# Patient Record
(demographics unavailable — no encounter records)

---

## 2025-04-09 NOTE — PHYSICAL EXAM
[Sclera] : the sclera and conjunctiva were normal [PERRL With Normal Accommodation] : pupils were equal in size, round, and reactive to light [Extraocular Movements] : extraocular movements were intact [Neck Appearance] : the appearance of the neck was normal [Neck Cervical Mass (___cm)] : no neck mass was observed [Jugular Venous Distention Increased] : there was no jugular-venous distention [Thyroid Diffuse Enlargement] : the thyroid was not enlarged [Thyroid Nodule] : there were no palpable thyroid nodules [Auscultation Breath Sounds / Voice Sounds] : lungs were clear to auscultation bilaterally [Heart Rate And Rhythm] : heart rate was normal and rhythm regular [Heart Sounds] : normal S1 and S2 [Heart Sounds Gallop] : no gallops [Murmurs] : no murmurs [Heart Sounds Pericardial Friction Rub] : no pericardial rub [Examination Of The Chest] : the chest was normal in appearance [Chest Visual Inspection Thoracic Asymmetry] : no chest asymmetry [Diminished Respiratory Excursion] : normal chest expansion [Bowel Sounds] : normal bowel sounds [Abdomen Soft] : soft [Abdomen Tenderness] : non-tender [Abdomen Mass (___ Cm)] : no abdominal mass palpated [Abnormal Walk] : normal gait [Nail Clubbing] : no clubbing  or cyanosis of the fingernails [Musculoskeletal - Swelling] : no joint swelling seen [Motor Tone] : muscle strength and tone were normal [Skin Color & Pigmentation] : normal skin color and pigmentation [Skin Turgor] : normal skin turgor [] : no rash [Oriented To Time, Place, And Person] : oriented to person, place, and time [Impaired Insight] : insight and judgment were intact [Affect] : the affect was normal

## 2025-04-09 NOTE — PHYSICAL EXAM
[Sclera] : the sclera and conjunctiva were normal [PERRL With Normal Accommodation] : pupils were equal in size, round, and reactive to light [Extraocular Movements] : extraocular movements were intact [Neck Appearance] : the appearance of the neck was normal [Neck Cervical Mass (___cm)] : no neck mass was observed [Jugular Venous Distention Increased] : there was no jugular-venous distention [Thyroid Diffuse Enlargement] : the thyroid was not enlarged [Thyroid Nodule] : there were no palpable thyroid nodules [Auscultation Breath Sounds / Voice Sounds] : lungs were clear to auscultation bilaterally [Heart Rate And Rhythm] : heart rate was normal and rhythm regular [Heart Sounds] : normal S1 and S2 [Heart Sounds Gallop] : no gallops [Murmurs] : no murmurs [Heart Sounds Pericardial Friction Rub] : no pericardial rub [Examination Of The Chest] : the chest was normal in appearance [Chest Visual Inspection Thoracic Asymmetry] : no chest asymmetry [Diminished Respiratory Excursion] : normal chest expansion [Bowel Sounds] : normal bowel sounds [Abdomen Soft] : soft [Abdomen Tenderness] : non-tender [Abdomen Mass (___ Cm)] : no abdominal mass palpated [Nail Clubbing] : no clubbing  or cyanosis of the fingernails [Abnormal Walk] : normal gait [Musculoskeletal - Swelling] : no joint swelling seen [Motor Tone] : muscle strength and tone were normal [Skin Color & Pigmentation] : normal skin color and pigmentation [Skin Turgor] : normal skin turgor [] : no rash [Oriented To Time, Place, And Person] : oriented to person, place, and time [Impaired Insight] : insight and judgment were intact [Affect] : the affect was normal

## 2025-04-10 NOTE — ASSESSMENT
[FreeTextEntry1] : CT Chest (@[facility]) images reviewed and discussed revealing: -revisualization of cavitations in the periphery of the RUL, -The largest in the lateral ANNA associated with internal nodularity measuring up to 1.4 x 0.9 cm, slightly changed in position.  -The peripheral soft tissue component, including a minimally spiculated posterior RUL 1.6 x 1.2cm nodule, unchanged -Alternatives discussed such as surgical resection, including all associated risks and benefits. -Patient amenable to surgery. -Patient discussed his concern for his IVC Filter removal and was advised to have removed after procedure.  -Plan  #RUL Cavitation - Will plan for Bronch, Robotic assisted Right Thoracoscopy upper lobe wedge resection, possible segment on 4/21 -Preop: PAST, CT Chest, Cardiac Clearance (Dr. Kelley) recs for Xarelto hold.  -PFTs (from Dr. Harden forwarded) reviewed I, Vin Myers saw, examined and reviewed the diagnostic images on patient:  DAHIANA FLOWERS on 04/08/2025 and agreed with my Nurse Practitioner's clinical note, physical exam findings and treatment plan. 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  I had performed robotic bronchoscopy and fine-needle aspiration of the right upper lobe cavitary lesion in August 2024 cultures revealed Aspergillus.  Patient has been follow-up by pulmonary and ID and referred back to thoracic surgery to consider surgical treatment for aspergilloma.  I reviewed the CT scan images with the patient and the PFTs which are adequate for resection, with 2 cavitary lesions amenable for resection.  I described in detail robotic assisted right thoracoscopy upper lobe wedge resection possible segmentectomy/lobectomy.  Surgical risk and possible complications as well as expected recovery were disclosed.  Patient understood and agreed to proceed.

## 2025-04-10 NOTE — REASON FOR VISIT
[Follow-Up: _____] : a [unfilled] follow-up visit [FreeTextEntry1] : CT Chest review (@[facility]) RUL/ ANNA Cavitations

## 2025-04-10 NOTE — HISTORY OF PRESENT ILLNESS
[FreeTextEntry1] : Mr. Aniket Suero is a 68 y/o M, former smoker, S/P Bronch with FNA to Right Lung on 8/15/2024, Path proven negative for malignancy + Fungal organisms consistent with Aspergillus present. Patient recently had CT Chest (@[facility]) which reveals RUL & ANNA cavitations and a minimally spiculated posterior RUL 1.6 x 1.2 cm nodule. PMHX: HTN, DLD, DM, CVA on Xarelto, ILR, Seizure Disorder (last seizure 6/2024), FABIANA not on CPAP, DVT / PE,  s/p IVC Filter 6/2024 Patient is here today for review of CT Chest and possible surgical discussion.   ECOG 0 Fully Independent, ambulates with cane Lives alone, no aide Former Smoker- 1 PD X 30+ years Quit 5/2022 Denies major psychiatric history Patient is a Poor Historian  Exposure to Kerosene  Their Healthcare team is as follows: PMD: Justin Cardiologist: Dr Santos/Dr. Ana Lilia Kelley Pulmonologist: Dereck Whitaker: Dr. Carrion

## 2025-04-10 NOTE — REASON FOR VISIT
[Follow-Up: _____] : a [unfilled] follow-up visit [FreeTextEntry1] : CT Chest review (@[facility]) RUL/ ANNA Cavitations

## 2025-04-10 NOTE — REASON FOR VISIT
[Follow-Up: _____] : a [unfilled] follow-up visit [FreeTextEntry1] : CT Chest review (@[facility]) RUL/ ANNA Cavitations

## 2025-04-10 NOTE — HISTORY OF PRESENT ILLNESS
[FreeTextEntry1] : Mr. Aniket Suero is a 66 y/o M, former smoker, S/P Bronch with FNA to Right Lung on 8/15/2024, Path proven negative for malignancy + Fungal organisms consistent with Aspergillus present. Patient recently had CT Chest (@[facility]) which reveals RUL & ANNA cavitations and a minimally spiculated posterior RUL 1.6 x 1.2 cm nodule. PMHX: HTN, DLD, DM, CVA on Xarelto, ILR, Seizure Disorder (last seizure 6/2024), FABIANA not on CPAP, DVT / PE,  s/p IVC Filter 6/2024 Patient is here today for review of CT Chest and possible surgical discussion.   ECOG 0 Fully Independent, ambulates with cane Lives alone, no aide Former Smoker- 1 PD X 30+ years Quit 5/2022 Denies major psychiatric history Patient is a Poor Historian  Exposure to Kerosene  Their Healthcare team is as follows: PMD: Justin Cardiologist: Dr Santos/Dr. Ana Lilia Kelley Pulmonologist: Dereck Whitaker: Dr. Carrion

## 2025-04-10 NOTE — ASSESSMENT
[FreeTextEntry1] : CT Chest (@[facility]) images reviewed and discussed revealing: -revisualization of cavitations in the periphery of the RUL, -The largest in the lateral ANNA associated with internal nodularity measuring up to 1.4 x 0.9 cm, slightly changed in position.  -The peripheral soft tissue component, including a minimally spiculated posterior RUL 1.6 x 1.2cm nodule, unchanged -Alternatives discussed such as surgical resection, including all associated risks and benefits. -Patient amenable to surgery. -Patient discussed his concern for his IVC Filter removal and was advised to have removed after procedure.  -Plan  #RUL Cavitation - Will plan for Bronch, Robotic assisted Right Thoracoscopy upper lobe wedge resection, possible segment on 4/21 -Preop: PAST, CT Chest, Cardiac Clearance (Dr. Kelley) recs for Xarelto hold.  -PFTs (from Dr. Harden forwarded) reviewed I, Vin Myers saw, examined and reviewed the diagnostic images on patient:  DAHIANA FLOWERS on 04/08/2025 and agreed with my Nurse Practitioner's clinical note, physical exam findings and treatment plan. 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  I had performed robotic bronchoscopy and fine-needle aspiration of the right upper lobe cavitary lesion in August 2024 cultures revealed Aspergillus.  Patient has been follow-up by pulmonary and ID and referred back to thoracic surgery to consider surgical treatment for aspergilloma.  I reviewed the CT scan images with the patient and the PFTs which are adequate for resection, with 2 cavitary lesions amenable for resection.  I described in detail robotic assisted right thoracoscopy upper lobe wedge resection possible segmentectomy/lobectomy.  Surgical risk and possible complications as well as expected recovery were disclosed.  Patient understood and agreed to proceed.

## 2025-04-10 NOTE — ASSESSMENT
[FreeTextEntry1] : CT Chest (@[facility]) images reviewed and discussed revealing: -revisualization of cavitations in the periphery of the RUL, -The largest in the lateral ANNA associated with internal nodularity measuring up to 1.4 x 0.9 cm, slightly changed in position.  -The peripheral soft tissue component, including a minimally spiculated posterior RUL 1.6 x 1.2cm nodule, unchanged -Alternatives discussed such as surgical resection, including all associated risks and benefits. -Patient amenable to surgery. -Patient discussed his concern for his IVC Filter removal and was advised to have removed after procedure.  -Plan  #RUL Cavitation - Will plan for Bronch, Robotic assisted Right Thoracoscopy upper lobe wedge resection, possible segment on 4/21 -Preop: PAST, CT Chest, Cardiac Clearance (Dr. Kelley) recs for Xarelto hold.  -PFTs (from Dr. Harden forwarded) reviewed I, Vin Myers saw, examined and reviewed the diagnostic images on patient:  DAHIANA FLOWERS on 04/08/2025 and agreed with my Nurse Practitioner's clinical note, physical exam findings and treatment plan. 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  I had performed robotic bronchoscopy and fine-needle aspiration of the right upper lobe cavitary lesion in August 2024 cultures revealed Aspergillus.  Patient has been follow-up by pulmonary and ID and referred back to thoracic surgery to consider surgical treatment for aspergilloma.  I reviewed the CT scan images with the patient and the PFTs which are adequate for resection, with 2 cavitary lesions amenable for resection.  I described in detail robotic assisted right thoracoscopy upper lobe wedge resection possible segmentectomy/lobectomy.  Surgical risk and possible complications as well as expected recovery were disclosed.  Patient understood and agreed to proceed.

## 2025-04-10 NOTE — REASON FOR VISIT
[Follow-Up: _____] : a [unfilled] follow-up visit [FreeTextEntry1] : CT Chest review (@[facility]) RUL/ ANNA Cavitations

## 2025-04-10 NOTE — ASSESSMENT
[FreeTextEntry1] : CT Chest (@[facility]) images reviewed and discussed revealing: -revisualization of cavitations in the periphery of the RUL, -The largest in the lateral ANNA associated with internal nodularity measuring up to 1.4 x 0.9 cm, slightly changed in position.  -The peripheral soft tissue component, including a minimally spiculated posterior RUL 1.6 x 1.2cm nodule, unchanged -Alternatives discussed such as surgical resection, including all associated risks and benefits. -Patient amenable to surgery. -Patient discussed his concern for his IVC Filter removal and was advised to have removed after procedure.  -Plan  #RUL Cavitation - Will plan for Bronch, Robotic assisted Right Thoracoscopy upper lobe wedge resection, possible segment on 4/21 -Preop: PAST, CT Chest, Cardiac Clearance (Dr. Kelley) recs for Xarelto hold.  -PFTs (from Dr. aHrden forwarded) reviewed I, Vin Myers saw, examined and reviewed the diagnostic images on patient:  DAHIANA FLOWERS on 04/08/2025 and agreed with my Nurse Practitioner's clinical note, physical exam findings and treatment plan. 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  I had performed robotic bronchoscopy and fine-needle aspiration of the right upper lobe cavitary lesion in August 2024 cultures revealed Aspergillus.  Patient has been follow-up by pulmonary and ID and referred back to thoracic surgery to consider surgical treatment for aspergilloma.  I reviewed the CT scan images with the patient and the PFTs which are adequate for resection, with 2 cavitary lesions amenable for resection.  I described in detail robotic assisted right thoracoscopy upper lobe wedge resection possible segmentectomy/lobectomy.  Surgical risk and possible complications as well as expected recovery were disclosed.  Patient understood and agreed to proceed.

## 2025-05-07 NOTE — REASON FOR VISIT
[de-identified] : robotic assisted right VATs, RUL Wedge resection X 2 [de-identified] : 4/21/25 [de-identified] : Post op Visit and Path review (No Malignancy)

## 2025-05-07 NOTE — PHYSICAL EXAM
[] : no respiratory distress [Auscultation Breath Sounds / Voice Sounds] : lungs were clear to auscultation bilaterally [Heart Rate And Rhythm] : heart rate was normal and rhythm regular [Heart Sounds] : normal S1 and S2 [Heart Sounds Gallop] : no gallops [Murmurs] : no murmurs [Heart Sounds Pericardial Friction Rub] : no pericardial rub [Clean] : clean [Dry] : dry [Healing Well] : healing well [No Edema] : no edema [FreeTextEntry2] : Right Mid axillary

## 2025-05-07 NOTE — ASSESSMENT
[FreeTextEntry1] : Mr. DAHIANA FLOWERS is a 67 year M, former smoker, PMH: HTN, DLD, DM, CVA on Xarelto (held during hospital stay) , ILR, Seizure Disorder (last seizure 2 Months ago), FABIANA, IVC Filter 6/2024. Patient known to service for 3.6 X2.5 and 3.5 X 2.5 CM and FDG Avid RUL cavitary Masses s/p Bronch with FNA (8/15/2024) Path + Fungal organisms consistent with Aspergillus. On 4/21/25, patient presented for robotic assisted right VATs for right upper lobe wedge resections. Post-operatively patient had a right chest tube that was placed to suction with a small airleak, output was 210 cc SS. Patient was on 4L NC and was pulling 500- 1000 on IS. Post op chest xray was stable with no signs of pneumothorax. Patient was receiving ancef for abx. On 04/22 chest tube was to waterseal with no air leak. Patients glucose has spiked to the 600s while in CTU and was given insulin and went down to 180s. Patient was also downgraded. On 4/23 chest tube was removed and post pull x ray with no pneumothorax. Pain was controlled. Tolerated diet. Denies SOB, chest pain, nausea , or vomiting. Patient is stable and afebrile. Labs were reviewed and within normal limits. Vitals are within normal limits. Patient was discharged on 04/23 and was informed to follow up with Dr. Spencer Myers in 2 weeks.  Chest xray obtained (@ [facility]) prior to office visit. Patient states he fell at home a few days ago, fall was mechanical, states he had a superficial abbrasion to his head, denies any LOC, dizziness, visual disturbances or any neurological deficits. Educated patient to report to ED if falls and has trauma to his head, as he is on blood thinners.   -Plan #s/p RUL Wedge resections -CXR Reviewed: Stable -Pain: is having moderate amount of pain ineffectively alleviated with OTC analgesia -RX'd Percocet PRN  -Incisions: healing well -Pathology reveals:  -Right upper lobe, nodule #1 & # 2: wedge resections: - Pulmonary aspergillosis, subacute, with chronic inflammation, necrosis, cavitary formation, and fibrosis. 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 - Emphysema of the lung -No need for ID follow up at this time (as per Dr. Harris) -F/U with Pulmonary: Dr. Harden for continued management -Patient inquired about Physical Therapy and is not restricted from resuming, if maintaining heavy lifting restriction -Referral sent to resume PT -CT Chest in 4 months -F/U CTS for review I, Vin Myers saw, examined and reviewed the diagnostic images on patient:  DAHIANA FLOWERS on 05/06/2025 and agreed with my Nurse Practitioner's clinical note, physical exam findings and treatment plan. Patient presented for postoperative follow-up.  He underwent robotic assisted right thoracoscopy right upper lobe wedge resection x 2, 2 cavitary lesions were resected for a known diagnosis of aspergilloma.  Procedure date 4/21/2025.  Procedure without complications.  Patient is recovering well.  Surgical wounds are healing fine.  Chest x-ray with expected postoperative changes.  Pathology report reviewed with the patient no evidence of malignancy Aspergillus identified in the specimen.  Patient is to return to see ID and pulmonary.  Return to CT surgery in 4 months with a chest CT.  Activity recommendations given.

## 2025-05-07 NOTE — REASON FOR VISIT
[de-identified] : robotic assisted right VATs, RUL Wedge resection X 2 [de-identified] : 4/21/25 [de-identified] : Post op Visit and Path review (No Malignancy)

## 2025-05-07 NOTE — COUNSELING
[Hygeine (Including Daily Shower)] : hygeine (including daily shower) [Importance of Regular Medical Follow-Up] : the importance of regular medical follow-up [No Heavy Lifting] : no heavy lifting (>15-20 lb. for 1 month or 25 lb. for 3 months from date of surgery) [Blood Pressure Control] : blood pressure control [S/S of infection] : signs and symptoms of infection (and to whom it should be reported) [Progressive Ambulation/Activity] : progressive ambulation/activity [Medication/Vitamin/Herb/Food Interaction] : medication/vitamin/herb/food interaction [FreeTextEntry1] :  Mr. DAHIANA FLOWERS 67 year M   , S/P Lung Resection.  Incisions healing well and all sutures were removed. Incision site care was reviewed. No lotions, perfumes to the incision site. On arrival patient denies fever, chills nausea, and vomiting. Denies SOB or palpitation. All questions and concerns were addressed.  Medications were reviewed with the patient. Pain needs addressed.  Advised no flying or lifting anything >10lbs for at least 6 weeks post thoracic surgery.

## 2025-05-07 NOTE — ASSESSMENT
[FreeTextEntry1] : Mr. DAHIANA FLOWERS is a 67 year M, former smoker, PMH: HTN, DLD, DM, CVA on Xarelto (held during hospital stay) , ILR, Seizure Disorder (last seizure 2 Months ago), FABIANA, IVC Filter 6/2024. Patient known to service for 3.6 X2.5 and 3.5 X 2.5 CM and FDG Avid RUL cavitary Masses s/p Bronch with FNA (8/15/2024) Path + Fungal organisms consistent with Aspergillus. On 4/21/25, patient presented for robotic assisted right VATs for right upper lobe wedge resections. Post-operatively patient had a right chest tube that was placed to suction with a small airleak, output was 210 cc SS. Patient was on 4L NC and was pulling 500- 1000 on IS. Post op chest xray was stable with no signs of pneumothorax. Patient was receiving ancef for abx. On 04/22 chest tube was to waterseal with no air leak. Patients glucose has spiked to the 600s while in CTU and was given insulin and went down to 180s. Patient was also downgraded. On 4/23 chest tube was removed and post pull x ray with no pneumothorax. Pain was controlled. Tolerated diet. Denies SOB, chest pain, nausea , or vomiting. Patient is stable and afebrile. Labs were reviewed and within normal limits. Vitals are within normal limits. Patient was discharged on 04/23 and was informed to follow up with Dr. Spencer Myers in 2 weeks.  Chest xray obtained (@ [facility] [place]) prior to office visit. Patient states he fell at home a few days ago, fall was mechanical, states he had a superficial abbrasion to his head, denies any LOC, dizziness, visual disturbances or any neurological deficits. Educated patient to report to ED if falls and has trauma to his head, as he is on blood thinners.   -Plan #s/p RUL Wedge resections -CXR Reviewed: Stable -Pain: is having moderate amount of pain ineffectively alleviated with OTC analgesia -RX'd Percocet PRN  -Incisions: healing well -Pathology reveals:  -Right upper lobe, nodule #1 & # 2: wedge resections: - Pulmonary aspergillosis, subacute, with chronic inflammation, necrosis, cavitary formation, and fibrosis. 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 - Emphysema of the lung -No need for ID follow up at this time (as per Dr. Harris) -F/U with Pulmonary: Dr. Harden for continued management -Patient inquired about Physical Therapy and is not restricted from resuming, if maintaining heavy lifting restriction -Referral sent to resume PT -CT Chest in 4 months -F/U CTS for review I, Vin Myers saw, examined and reviewed the diagnostic images on patient:  DAHIANA FLOWERS on 05/06/2025 and agreed with my Nurse Practitioner's clinical note, physical exam findings and treatment plan. Patient presented for postoperative follow-up.  He underwent robotic assisted right thoracoscopy right upper lobe wedge resection x 2, 2 cavitary lesions were resected for a known diagnosis of aspergilloma.  Procedure date 4/21/2025.  Procedure without complications.  Patient is recovering well.  Surgical wounds are healing fine.  Chest x-ray with expected postoperative changes.  Pathology report reviewed with the patient no evidence of malignancy Aspergillus identified in the specimen.  Patient is to return to see ID and pulmonary.  Return to CT surgery in 4 months with a chest CT.  Activity recommendations given.